# Patient Record
(demographics unavailable — no encounter records)

---

## 2024-11-25 NOTE — PHYSICAL EXAM
[No Acute Distress] : no acute distress [Well Nourished] : well nourished [Well Developed] : well developed [Normal Sclera/Conjunctiva] : normal sclera/conjunctiva [PERRL] : pupils equal, round and reactive to light [EOMI] : extra ocular movement intact [Normal Outer Ear/Nose] : the ears and nose were normal in appearance [Normal Oropharynx] : the oropharynx was normal [No Respiratory Distress] : no respiratory distress [Clear to Auscultation] : lungs were clear to auscultation bilaterally [No Accessory Muscle Use] : no accessory muscle use [Normal Rate] : heart rate was normal  [Regular Rhythm] : with a regular rhythm [Normal S1, S2] : normal S1 and S2 [No Murmurs] : no murmurs heard [No Edema] : there was no peripheral edema [Normal Bowel Sounds] : normal bowel sounds [Non Tender] : non-tender [Soft] : abdomen soft [Not Distended] : not distended [Normal Post Cervical Nodes] : no posterior cervical lymphadenopathy [Normal Anterior Cervical Nodes] : no anterior cervical lymphadenopathy [No CVA Tenderness] : no ~M costovertebral angle tenderness [No Spinal Tenderness] : no spinal tenderness [Normal Gait] : normal gait [Normal Strength/Tone] : muscle strength and tone were normal [No Rash] : no rash [Cranial Nerves Intact] : cranial nerves 2-12 were intact [No Motor Deficits] : the motor exam was normal [Normal Reflexes] : deep tendon reflexes were 2+ and symmetric [Oriented x3] : oriented to person, place, and time [Normal Affect] : the affect was normal [Normal Mood] : the mood was normal [Normal Insight/Judgement] : insight and judgment were intact [Over the Past 2 Weeks, Have You Felt Down, Depressed, or Hopeless?] : 1.) Over the past 2 weeks, have you felt down, depressed, or hopeless? No [Over the Past 2 Weeks, Have You Felt Little Interest or Pleasure Doing Things?] : 2.) Over the past 2 weeks, have you felt little interest or pleasure doing things? No [Foot Ulcers] : no foot ulcers [de-identified] : Overweight female, sitting comfortably on living room marc loco.

## 2024-11-25 NOTE — COUNSELING
[Overweight - ( BMI 25.1 - 29.9 )] : overweight -  ( BMI 25.1 - 29.9 ) [Continue diet as tolerated] : continue diet as tolerated based on goals of care [Non - Smoker] : non-smoker [Smoke/CO Detectors] : smoke/CO detectors [] : foot exam [Not Recommended] : Aspirin use not recommended due to overall prognosis [Decrease hospital use] : decrease hospital use [Minimize unnecessary interventions] : minimize unnecessary interventions [Patient/Caregiver has ___ understanding of disease process] : patient/caregiver has [unfilled] understanding of disease process [Completed Medical Orders for Life-Sustaining Treatment] : completed medical orders for life-sustaining treatment [Full Code] : Code Status: Full Code [No Limitations] : Treatment Guidelines: No limitations [Trial of Intubation] : Intubation: Trial of Intubation [Last Ver[facility] Date: _____] [facility] Completion/last verification date: [unfilled] [FreeTextEntry1] : funtioning well, no interest in weight loss

## 2024-11-25 NOTE — REASON FOR VISIT
[Follow-Up] : a follow-up visit [Family Member] : family member [Formal Caregiver] : formal caregiver [Pre-Visit Preparation] : pre-visit preparation was done [FreeTextEntry2] : chart review

## 2024-11-25 NOTE — REVIEW OF SYSTEMS
[Heartburn] : heartburn [Negative] : Heme/Lymph [Palpitations] : no palpitations [Lower Ext Edema] : no lower extremity edema [Abdominal Pain] : no abdominal pain [Nausea] : no nausea [Constipation] : no constipation [Diarrhea] : diarrhea [Vomiting] : no vomiting [FreeTextEntry7] : Gas, gas pain [FreeTextEntry9] : Low back pain, R leg numbness

## 2024-11-25 NOTE — HISTORY OF PRESENT ILLNESS
[Patient] : patient [FreeTextEntry1] : [facility] [FreeText[facility]2] : PMH: Afib on AC, loop recorder in place, pre-DM, thrombocytosis, HTN, HLD, GERD, OA, hospitalized 2019 for ACEI related angioedema.   Purpose of Visit: Routine f/u  Past Relevant Hospitalizations: None reviewed today.  Social/Home Environment: From [facility], family originally from [facility]. Gnosticism Denominational, still attends [employer] with her family (currently by [employer]); takes credit for converting her 15 (!) siblings from Catholicism. Previously lived with , but he passed away recently after being on hospice for a time. Daughter Yolie works from home and occasionally comes into living room to give additional info during visit. Lives in two-story home with 2 step access. -Pain management Dr. Gopi Stoll in [place] [phone], PCP Dr. Joseph Shields, GI Dr. Mooney, neurology, EP cards Dr. Yonny Thompson  Today's Visit & Review: -Interviewed alone, HHA present -Per description, receiving Euflexxa injections to both knees, working with pain management -Describes having burnt the roof her mouth -Saw Dr. Shields (PCP) 2024, returns 2025 -Refilled chlorhexidine mouthwash -BP log reviewed, averages 120/60s after medications, despite this morning's apparently high pressure   -Frustrated by stomach pain, felt best relief with Dexilant but insurance no longer covering -On pantoprazole in interim -Has Maalox, uses intermittently. Has pepto-bismol, but causes constipation -Has headaches associated with abdominal gas, feels the gas enters her head somehow. Explained this is unlikely & explained anatomy of bowel gas -Advise pt to discuss stopping gabapentin with Dr. Shields. She does not want to DC it until speaking with him. It's a very low dose, and she attributes gastric distress to the capsule. Writer sees it as low stakes to discontinue.  -Afib: Loop recorder in place. Eliquis 5mg BID. Metoprolol 50mg BID. -Spinal compression fractures: Chronic, cause of some pain & gait difficulty, referred previously to pain management by spine specialist. Raloxifene 60mg daily -Chronic abdominal pain: Previously on Dexilant BID via GI, recently with cost difficulty, which pt states helped her where other PPIs did not. Pt has chronic issue with abdominal gas pain for years.  DME: None, no needs today.  BLOOD PRESSURE CUFF NEED: -Pt has hypertension, and takes multiple medications to manage it -Pt would benefit from home blood pressure cuff -Ordering blood pressure cuff  ADVANCE CARE PLANNIN. Total Time Spent: 20 min 2. Participants: Myself, patient 3. Discussion: Goals of Care, Advanced Directives, Health Care Agency, and Disease trajectory 4. Disease Trajectory: Discussed and reviewed that patient's health is currently stable, and the prognosis moving forward is unclear. 5. Prognosis, Based On Clinician Best Judgment: Indeterminate 6. Goals of Care: 1) Extend life, by hospitalization and aggressive measures if needed, 2) Avoid discomfort, 3) Manage medical problems at home where safely possible. 7. HCA: abby Hyde 8. MOLST Completed: CPR yes, intubation and trial ventilation, do hospitalize, limited medical interventions, trial feeding tube, trial IVF, use antibiotics. 9. Hospice Benefit discussion deferred at this time.

## 2025-05-30 NOTE — PHYSICAL EXAM
[No Acute Distress] : no acute distress [Well Nourished] : well nourished [Well Developed] : well developed [Normal Sclera/Conjunctiva] : normal sclera/conjunctiva [PERRL] : pupils equal, round and reactive to light [EOMI] : extra ocular movement intact [Normal Outer Ear/Nose] : the ears and nose were normal in appearance [Normal Oropharynx] : the oropharynx was normal [No Respiratory Distress] : no respiratory distress [Clear to Auscultation] : lungs were clear to auscultation bilaterally [No Accessory Muscle Use] : no accessory muscle use [Normal Rate] : heart rate was normal  [Regular Rhythm] : with a regular rhythm [Normal S1, S2] : normal S1 and S2 [No Murmurs] : no murmurs heard [No Edema] : there was no peripheral edema [Normal Bowel Sounds] : normal bowel sounds [Non Tender] : non-tender [Soft] : abdomen soft [Not Distended] : not distended [Normal Post Cervical Nodes] : no posterior cervical lymphadenopathy [Normal Anterior Cervical Nodes] : no anterior cervical lymphadenopathy [No CVA Tenderness] : no ~M costovertebral angle tenderness [No Spinal Tenderness] : no spinal tenderness [Normal Gait] : normal gait [Normal Strength/Tone] : muscle strength and tone were normal [No Rash] : no rash [Cranial Nerves Intact] : cranial nerves 2-12 were intact [No Motor Deficits] : the motor exam was normal [Normal Reflexes] : deep tendon reflexes were 2+ and symmetric [Oriented x3] : oriented to person, place, and time [Normal Affect] : the affect was normal [Normal Mood] : the mood was normal [Normal Insight/Judgement] : insight and judgment were intact [Over the Past 2 Weeks, Have You Felt Down, Depressed, or Hopeless?] : 1.) Over the past 2 weeks, have you felt down, depressed, or hopeless? No [Over the Past 2 Weeks, Have You Felt Little Interest or Pleasure Doing Things?] : 2.) Over the past 2 weeks, have you felt little interest or pleasure doing things? No [Foot Ulcers] : no foot ulcers [de-identified] : Overweight female, sitting comfortably on living room marc loco.

## 2025-05-30 NOTE — HISTORY OF PRESENT ILLNESS
[Patient] : patient [FreeTextEntry1] : N/A[facility] [FreeTextEntry2[facility] : PMH: Afib on AC, loop recorder in place, pre-DM, thrombocytosis, HTN, HLD, GERD, OA, hospitalized 2019 for ACEI related angioedema.   Purpose of Visit: Routine f/u  Past Relevant Hospitalizations: None reviewed today.  Social/Home Environment: From [facility], family originally from [facility]. Moravian Judaism, still attends [employer] with her family (currently by [employer]); takes credit for converting her 15 (!) siblings from Catholicism. Previously lived with , but he passed away recently after being on hospice for a time. Daughter Yolie works from home and occasionally comes into living room to give additional info during visit. Lives in two-story home with 2 step access. -Pain management Dr. Gopi Stoll in [place] [phone], PCP Dr. Joseph Shields, GI Dr. Mooney, neurology, EP cards Dr. Yonny Thompson, new cardiologist Dr. Castillo Tapia at [facility] [phone] 2025  Today's Visit & Review: -Interviewed alone, HHA present -Pt has several questions about recommendations from new cardiogist. Pt advised to take BP on left arm moving forward by cardiologist. Confirm this is based in some expert opinions, but studies have been inconsistent and it's not stemming from a recommendation or guideline. -Was advised by cardiologist to substitute Miralax, reduce senna, apparently out of concerns of bowel toxicity from senna. There's no evidence of this happening under normal circumstances, but rather with "purging" behavior by certain individuals who use extreme doses. She now reports sore anus from overly frequent BMs. Advise she can titrate Miralax amount to effect. -Stress test planned for 2025 -Remains on gabapentin after last disussion & discussing with Dr. Shields -Cards considering switch from Eliquis to ASA, but wants ILR re-placed. Pt is uncertain about having ILR placed again -- advise overall I would follow guidance of her specialist. -Sees Dr. Tapia (cards) again in 1 month -Returns soon for next Euflexxa injections  Select History of Problems: -Afib S/p ILR, then removal after 5 years. -Spinal compression fractures: Chronic, cause of some pain & gait difficulty, referred previously to pain management by spine specialist. Raloxifene 60mg daily -Chronic abdominal pain: Previously on Dexilant BID via GI, recently with cost difficulty, which pt states helped her where other PPIs did not. Pt has chronic issue with abdominal gas pain for years.  DME: None, no needs today.  BLOOD PRESSURE CUFF NEED: -Pt has hypertension, and takes multiple medications to manage it -Pt would benefit from home blood pressure cuff -Ordering blood pressure cuff  PHQ2 DEPRESSION SCREENIN. Little or no interest or pleasure in doing things: Not at all 2. Feeling down, depressed, or hopeless: Not at all    TICS Drug Screenin. Have you ever drunk or used drugs more than you meant to? No 2. Have you felt you wanted or needed to cut down on your drinking or drug use? No   Opioid Use Assessment: -Pt is not using opioids  ADVANCE CARE PLANNIN. Total Time Spent: 20 min 2. Participants: Myself, patient 3. Discussion: Goals of Care, Advanced Directives, Health Care Agency, and Disease trajectory 4. Disease Trajectory: Discussed and reviewed that patient's health is currently stable, and the prognosis moving forward is unclear. 5. Prognosis, Based On Clinician Best Judgment: Indeterminate 6. Goals of Care: 1) Extend life, by hospitalization and aggressive measures if needed, 2) Avoid discomfort, 3) Manage medical problems at home where safely possible. 7. HCA: daughter Mary Ellen 8. MOLST Completed: CPR yes, intubation and trial ventilation, do hospitalize, limited medical interventions, trial feeding tube, trial IVF, use antibiotics. 9. Hospice Benefit discussion deferred at this time.

## 2025-05-30 NOTE — PHYSICAL EXAM
[No Acute Distress] : no acute distress [Well Nourished] : well nourished [Well Developed] : well developed [Normal Sclera/Conjunctiva] : normal sclera/conjunctiva [PERRL] : pupils equal, round and reactive to light [EOMI] : extra ocular movement intact [Normal Outer Ear/Nose] : the ears and nose were normal in appearance [Normal Oropharynx] : the oropharynx was normal [No Respiratory Distress] : no respiratory distress [Clear to Auscultation] : lungs were clear to auscultation bilaterally [No Accessory Muscle Use] : no accessory muscle use [Normal Rate] : heart rate was normal  [Regular Rhythm] : with a regular rhythm [Normal S1, S2] : normal S1 and S2 [No Murmurs] : no murmurs heard [No Edema] : there was no peripheral edema [Normal Bowel Sounds] : normal bowel sounds [Non Tender] : non-tender [Soft] : abdomen soft [Not Distended] : not distended [Normal Post Cervical Nodes] : no posterior cervical lymphadenopathy [Normal Anterior Cervical Nodes] : no anterior cervical lymphadenopathy [No CVA Tenderness] : no ~M costovertebral angle tenderness [No Spinal Tenderness] : no spinal tenderness [Normal Gait] : normal gait [Normal Strength/Tone] : muscle strength and tone were normal [No Rash] : no rash [Cranial Nerves Intact] : cranial nerves 2-12 were intact [No Motor Deficits] : the motor exam was normal [Normal Reflexes] : deep tendon reflexes were 2+ and symmetric [Oriented x3] : oriented to person, place, and time [Normal Affect] : the affect was normal [Normal Mood] : the mood was normal [Normal Insight/Judgement] : insight and judgment were intact [Over the Past 2 Weeks, Have You Felt Down, Depressed, or Hopeless?] : 1.) Over the past 2 weeks, have you felt down, depressed, or hopeless? No [Over the Past 2 Weeks, Have You Felt Little Interest or Pleasure Doing Things?] : 2.) Over the past 2 weeks, have you felt little interest or pleasure doing things? No [Foot Ulcers] : no foot ulcers [de-identified] : Overweight female, sitting comfortably on living room marc loco.

## 2025-05-30 NOTE — HISTORY OF PRESENT ILLNESS
[Patient] : patient [FreeTextEntry1] : [facility] : PMH: Afib on AC, loop recorder in place, pre-DM, thrombocytosis, HTN, HLD, GERD, OA, hospitalized 2019 for ACEI related angioedema.   Purpose of Visit: Routine f/u  Past Relevant Hospitalizations: None reviewed today.  Social/Home Environment: From [facility], family originally from [facility]. Anglican Yazidism, still attends [employer] with her family (currently by [employer]); takes credit for converting her 15 (!) siblings from Catholicism. Previously lived with , but he passed away recently after being on hospice for a time. Daughter Yolie works from home and occasionally comes into living room to give additional info during visit. Lives in two-story home with 2 step access. -Pain management Dr. Gopi Stoll in [place] [phone], PCP Dr. Joseph Shields, GI Dr. Mooney, neurology, EP cards Dr. Yonny Thompson, new cardiologist Dr. Castillo Tapia at [facility] [phone] 2025  Today's Visit & Review: -Interviewed alone, HHA present -Pt has several questions about recommendations from new cardiogist. Pt advised to take BP on left arm moving forward by cardiologist. Confirm this is based in some expert opinions, but studies have been inconsistent and it's not stemming from a recommendation or guideline. -Was advised by cardiologist to substitute Miralax, reduce senna, apparently out of concerns of bowel toxicity from senna. There's no evidence of this happening under normal circumstances, but rather with "purging" behavior by certain individuals who use extreme doses. She now reports sore anus from overly frequent BMs. Advise she can titrate Miralax amount to effect. -Stress test planned for 2025 -Remains on gabapentin after last disussion & discussing with Dr. Shields -Cards considering switch from Eliquis to ASA, but wants ILR re-placed. Pt is uncertain about having ILR placed again -- advise overall I would follow guidance of her specialist. -Sees Dr. Tapia (cards) again in 1 month -Returns soon for next Euflexxa injections  Select History of Problems: -Afib S/p ILR, then removal after 5 years. -Spinal compression fractures: Chronic, cause of some pain & gait difficulty, referred previously to pain management by spine specialist. Raloxifene 60mg daily -Chronic abdominal pain: Previously on Dexilant BID via GI, recently with cost difficulty, which pt states helped her where other PPIs did not. Pt has chronic issue with abdominal gas pain for years.  DME: None, no needs today.  BLOOD PRESSURE CUFF NEED: -Pt has hypertension, and takes multiple medications to manage it -Pt would benefit from home blood pressure cuff -Ordering blood pressure cuff  PHQ2 DEPRESSION SCREENIN. Little or no interest or pleasure in doing things: Not at all 2. Feeling down, depressed, or hopeless: Not at all    TICS Drug Screenin. Have you ever drunk or used drugs more than you meant to? No 2. Have you felt you wanted or needed to cut down on your drinking or drug use? No   Opioid Use Assessment: -Pt is not using opioids  ADVANCE CARE PLANNIN. Total Time Spent: 20 min 2. Participants: Myself, patient 3. Discussion: Goals of Care, Advanced Directives, Health Care Agency, and Disease trajectory 4. Disease Trajectory: Discussed and reviewed that patient's health is currently stable, and the prognosis moving forward is unclear. 5. Prognosis, Based On Clinician Best Judgment: Indeterminate 6. Goals of Care: 1) Extend life, by hospitalization and aggressive measures if needed, 2) Avoid discomfort, 3) Manage medical problems at home where safely possible. 7. HCA: daughter Mary Ellen 8. MOLST Completed: CPR yes, intubation and trial ventilation, do hospitalize, limited medical interventions, trial feeding tube, trial IVF, use antibiotics. 9. Hospice Benefit discussion deferred at this time.

## 2025-05-30 NOTE — COUNSELING
[Overweight - ( BMI 25.1 - 29.9 )] : overweight -  ( BMI 25.1 - 29.9 ) [Continue diet as tolerated] : continue diet as tolerated based on goals of care [Non - Smoker] : non-smoker [Smoke/CO Detectors] : smoke/CO detectors [] : foot exam [Not Recommended] : Aspirin use not recommended due to overall prognosis [Decrease hospital use] : decrease hospital use [Minimize unnecessary interventions] : minimize unnecessary interventions [Patient/Caregiver has ___ understanding of disease process] : patient/caregiver has [unfilled] understanding of disease process [Completed Medical Orders for Life-Sustaining Treatment] : completed medical orders for life-sustaining treatment [Full Code] : Code Status: Full Code [No Limitations] : Treatment Guidelines: No limitations [Trial of Intubation] : Intubation: Trial of Intubation [[facility]: _____[facility] Com[facility]/last verification date: [unfilled] [FreeTextEntry1] : funtioning well, no interest in weight loss [Established patient, extensive review of history, medical and functional status, risk factors and patient education] : Established patient, extensive review of history, medical and functional status, risk factors and patient education and counseling provided for subsequent annual wellness visit [ - New patient with 2 or more chronic conditions; CCM discussed and patient-centered care plan established] : New patient with 2 or more chronic conditions; CCM discussed and patient-centered care plan established

## 2025-05-30 NOTE — COUNSELING
[Overweight - ( BMI 25.1 - 29.9 )] : overweight -  ( BMI 25.1 - 29.9 ) [Continue diet as tolerated] : continue diet as tolerated based on goals of care [Non - Smoker] : non-smoker [Smoke/CO Detectors] : smoke/CO detectors [] : foot exam [Not Recommended] : Aspirin use not recommended due to overall prognosis [Decrease hospital use] : decrease hospital use [Minimize unnecessary interventions] : minimize unnecessary interventions [Patient/Caregiver has ___ understanding of disease process] : patient/caregiver has [unfilled] understanding of disease process [Completed Medical Orders for Life-Sustaining Treatment] : completed medical orders for life-sustaining treatment [Full Code] : Code Status: Full Code [No Limitations] : Treatment Guidelines: No limitations [Trial of Intubation] : Intubation: Trial of Intubation [[facility]: _____[facility] Com[facility]/last verification date: [unfilled] [FreeTextEntry1] : funtioning well, no interest in weight loss [Established patient, extensive review of history, medical and functional status, risk factors and patient education] : Established patient, extensive review of history, medical and functional status, risk factors and patient education and counseling provided for subsequent annual wellness visit [ - New patient with 2 or more chronic conditions; CCM discussed and patient-centered care plan established] : New patient with 2 or more chronic conditions; CCM discussed and patient-centered care plan established